# Patient Record
Sex: FEMALE | Race: WHITE | NOT HISPANIC OR LATINO | URBAN - METROPOLITAN AREA
[De-identification: names, ages, dates, MRNs, and addresses within clinical notes are randomized per-mention and may not be internally consistent; named-entity substitution may affect disease eponyms.]

---

## 2021-03-04 ENCOUNTER — OFFICE (OUTPATIENT)
Dept: URBAN - METROPOLITAN AREA CLINIC 102 | Facility: CLINIC | Age: 86
End: 2021-03-04
Payer: COMMERCIAL

## 2021-03-04 VITALS
TEMPERATURE: 97.3 F | SYSTOLIC BLOOD PRESSURE: 147 MMHG | WEIGHT: 133 LBS | HEART RATE: 62 BPM | DIASTOLIC BLOOD PRESSURE: 87 MMHG | HEIGHT: 65 IN

## 2021-03-04 DIAGNOSIS — K52.9 NONINFECTIVE GASTROENTERITIS AND COLITIS, UNSPECIFIED: ICD-10-CM

## 2021-03-04 PROCEDURE — 99204 OFFICE O/P NEW MOD 45 MIN: CPT

## 2021-03-04 NOTE — SERVICEHPINOTES
MILTON RANGEL   is a   85   year old    female who is being seen in consultation at the request of   RAFIQ LEONG   for diarrhea. Pt presents to visit with caregiver (Ava) who lives with pt. Her daughter (Jelly) is present via phone call, pt aware. Pt reports diarrhea "for as long as she can remember" as she has a nervous stomach and would have diarrhea anytime she would get anxious. However, her daughter states it worsened within the past 8-10 months. She takes Imodium daily, up to 4 pills but despite this will have BMs 2-3 times per day, BSS type 7. Urgency. No abdominal pain or cramping. No nocturnal BMs, although caregiver states she has. No blood in stool. Has lost weight the past couple years, although she does eat less.  Tried to stop dairy products x 2 weeks which did not help. Takes probiotics.BRShe did have stool tests with PCP (GI profile, c diff, o&ampp x1), although results not available, pt, caregiver, and daughter state they were negative. She does not have her gallbladder. Daughter states she has had multiple colonoscopies regularly due to family hx of polyps in her mother, thinks the last was maybe 10 years ago? No recent antibiotics or med changes.2/1/21--normal CBC (hgb 13.6).

## 2021-03-09 LAB
CALPROTECTIN, FECAL: 68 UG/G (ref 0–120)
PANCREATIC ELASTASE, FECAL: >500 UG ELAST./G

## 2021-03-23 ENCOUNTER — ON CAMPUS - OUTPATIENT (OUTPATIENT)
Dept: URBAN - METROPOLITAN AREA HOSPITAL 37 | Facility: HOSPITAL | Age: 86
End: 2021-03-23
Payer: COMMERCIAL

## 2021-03-23 DIAGNOSIS — R19.7 DIARRHEA, UNSPECIFIED: ICD-10-CM

## 2021-03-23 PROCEDURE — 45380 COLONOSCOPY AND BIOPSY: CPT | Performed by: INTERNAL MEDICINE

## 2021-04-29 ENCOUNTER — OFFICE (OUTPATIENT)
Dept: URBAN - METROPOLITAN AREA CLINIC 102 | Facility: CLINIC | Age: 86
End: 2021-04-29
Payer: COMMERCIAL

## 2021-04-29 VITALS
HEIGHT: 64 IN | SYSTOLIC BLOOD PRESSURE: 117 MMHG | WEIGHT: 124 LBS | HEART RATE: 66 BPM | TEMPERATURE: 97.5 F | DIASTOLIC BLOOD PRESSURE: 64 MMHG

## 2021-04-29 DIAGNOSIS — K52.9 NONINFECTIVE GASTROENTERITIS AND COLITIS, UNSPECIFIED: ICD-10-CM

## 2021-04-29 DIAGNOSIS — R63.4 ABNORMAL WEIGHT LOSS: ICD-10-CM

## 2021-04-29 LAB
C-REACTIVE PROTEIN, QUANT: 6 MG/L (ref 0–10)
CELIAC DISEASE COMPREHENSIVE: DEAMIDATED GLIADIN ABS, IGA: 14 UNITS (ref 0–19)
CELIAC DISEASE COMPREHENSIVE: DEAMIDATED GLIADIN ABS, IGG: 1 UNITS (ref 0–19)
CELIAC DISEASE COMPREHENSIVE: ENDOMYSIAL ANTIBODY IGA: NEGATIVE
CELIAC DISEASE COMPREHENSIVE: IMMUNOGLOBULIN A, QN, SERUM: 158 MG/DL (ref 64–422)
CELIAC DISEASE COMPREHENSIVE: T-TRANSGLUTAMINASE (TTG) IGA: <2 U/ML
CELIAC DISEASE COMPREHENSIVE: T-TRANSGLUTAMINASE (TTG) IGG: <2 U/ML

## 2021-04-29 PROCEDURE — 99214 OFFICE O/P EST MOD 30 MIN: CPT

## 2021-04-29 RX ORDER — CHOLESTYRAMINE
POWDER (GRAM) MISCELLANEOUS
Qty: 1 | Refills: 5 | Status: COMPLETED
Start: 2021-04-29 | End: 2022-03-14

## 2021-04-29 NOTE — SERVICEHPINOTES
MILTON RANGEL   is a   85  female who presents for diarrhea follow up. Colonoscopy done 3/23/21 was unremarkable and biopsies were negative for colitis. Fecal elastase was normal. Has been taking Lomotil 6-8 pills per day. Doing 2 pills TID which has not been helping. Per caregiver still having daily watery diarrhea. BMs 5 times per day when she is anxious, urgency. BMs 2-3 times per day on a "good day". No abdominal pain or cramping. Denies diet or sugar free drinks of foods. Has tried to avoid dairy x 1 month which did not help. S/p cholecystectomy. Has been taking a probiotic for a year now. Per daughter had CT scan in [place] a few months back which was normal. Has lost approx. 10 lbs since last visit, she is not eating much.BRShe follows with a psychiatrist, is taking Zoloft 200mg daily. States she has been on this for awhile, intermittently. BR

## 2021-04-29 NOTE — INTERFACERESULTNOTES
PT called in and is aware of results and has scheduled a F/U with Kathleen on 06/09 @ 8 am in [facility].

## 2022-03-14 ENCOUNTER — OFFICE (OUTPATIENT)
Dept: URBAN - METROPOLITAN AREA CLINIC 102 | Facility: CLINIC | Age: 87
End: 2022-03-14
Payer: COMMERCIAL

## 2022-03-14 VITALS
SYSTOLIC BLOOD PRESSURE: 128 MMHG | DIASTOLIC BLOOD PRESSURE: 66 MMHG | HEIGHT: 64 IN | HEART RATE: 65 BPM | WEIGHT: 128 LBS | TEMPERATURE: 97.5 F

## 2022-03-14 DIAGNOSIS — K52.9 NONINFECTIVE GASTROENTERITIS AND COLITIS, UNSPECIFIED: ICD-10-CM

## 2022-03-14 PROCEDURE — 99214 OFFICE O/P EST MOD 30 MIN: CPT | Performed by: PHYSICIAN ASSISTANT

## 2022-03-14 NOTE — SERVICEHPINOTES
Ms. Granados is an 86 yr old female here for f/u regarding diarrhea present for at least 5 yrs.   Colonoscopy done 3/23/21 was unremarkable and biopsies were negative for colitis. Fecal elastase was normal as was a CRP, celiac panel and fecal calprotectin. She has depression and anxiety and has tried many different medications for this under the care of a psychiatrist. Labs from 11/2021 are unremarkable except that her Na and chloride were low. She tried Lomotil and this didn't make a difference in her symptoms. A bad day would be up to 4 BMs per day. Average is a type 7 on the BSS and twice per day. Has tried to cut out milk and dairy which didn't help. She drinks about 2 sodas per day. No weight loss or abdominal pain.